# Patient Record
Sex: MALE | Race: WHITE
[De-identification: names, ages, dates, MRNs, and addresses within clinical notes are randomized per-mention and may not be internally consistent; named-entity substitution may affect disease eponyms.]

---

## 2021-12-10 ENCOUNTER — HOSPITAL ENCOUNTER (OUTPATIENT)
Dept: HOSPITAL 38 - CC.SDS | Age: 76
End: 2021-12-10
Attending: FAMILY MEDICINE
Payer: MEDICARE

## 2021-12-10 DIAGNOSIS — D12.3: ICD-10-CM

## 2021-12-10 DIAGNOSIS — E78.5: ICD-10-CM

## 2021-12-10 DIAGNOSIS — I10: ICD-10-CM

## 2021-12-10 DIAGNOSIS — H91.93: ICD-10-CM

## 2021-12-10 DIAGNOSIS — R09.82: ICD-10-CM

## 2021-12-10 DIAGNOSIS — Z98.890: ICD-10-CM

## 2021-12-10 DIAGNOSIS — N40.0: ICD-10-CM

## 2021-12-10 DIAGNOSIS — L30.9: ICD-10-CM

## 2021-12-10 DIAGNOSIS — Z12.11: Primary | ICD-10-CM

## 2021-12-10 DIAGNOSIS — Z79.899: ICD-10-CM

## 2021-12-10 DIAGNOSIS — E55.9: ICD-10-CM

## 2021-12-10 PROCEDURE — 45380 COLONOSCOPY AND BIOPSY: CPT

## 2021-12-10 NOTE — OR
DATE OF OPERATION:  12/10/2021

 

PREOPERATIVE DIAGNOSIS:  SCREENING COLONOSCOPY.

 

POSTOPERATIVE DIAGNOSIS:  SCREENING COLONOSCOPY.

 

SURGEON:  Eber Sousa MD

 

PROCEDURE:  FULL-LENGTH COLONOSCOPY WITH FORCEPS POLYP REMOVAL X1.

 

ANESTHESIA:  MAC.

 

COMPLICATIONS:  None.

 

SPECIMEN:  Small hyperplastic polyp, splenic flexure.

 

FINDINGS:

1. Full-length screening colonoscopy.

2. Small hyperplastic polyp, splenic flexure.

 

RECOMMENDATIONS:  Followup colonoscopy on an as-needed basis only given

patient's age.

 

INDICATIONS:  The patient is overdue for a routine screening scope.  Lesly Trujillo sent him for a screening colonoscopy.

 

DESCRIPTION OF PROCEDURE:  The patient was prepped and draped, placed in the

left lateral decubitus position.  A lubricated Olympus colonoscope was inserted

and easily advanced to the cecum.  Direct visualization of the ileocecal valve

and appendiceal orifice was accomplished.  The bowel prep was excellent.  Upon

withdrawal of the scope, cecum and ascending colon were completely benign.  At

the distal transverse colon right near the splenic flexure, patient had a small

hyperplastic polyp, probably 2-3 mm in size, removed it in its entirety with two

cold forceps biopsies without difficulty.  The rest of the descending colon was

unremarkable.  Throughout the sigmoid and rectosigmoid area, I could find no

further polyps, masses, ulceration, or bleeding sites.  No vascular

abnormalities or signs of colitis.  There were no diverticula.  The rectal vault

was unremarkable.

Retroflexion showed no perianal lesions.  Air was suctioned.  Scope was removed

without complication.

 

CHANEL/MARILEE

DD:  12/10/2021 09:29:07

DT:  12/10/2021 14:00:06

Job #:  [id number]

## 2022-07-17 ENCOUNTER — HOSPITAL ENCOUNTER (EMERGENCY)
Dept: HOSPITAL 38 - CC.ED | Age: 77
Discharge: HOME | End: 2022-07-17
Payer: MEDICARE

## 2022-07-17 DIAGNOSIS — Z79.899: ICD-10-CM

## 2022-07-17 DIAGNOSIS — I10: ICD-10-CM

## 2022-07-17 DIAGNOSIS — M53.3: Primary | ICD-10-CM

## 2022-07-17 DIAGNOSIS — E78.00: ICD-10-CM

## 2022-07-17 RX ADMIN — DEXAMETHASONE SODIUM PHOSPHATE ONE MG: 4 INJECTION, SOLUTION INTRAMUSCULAR; INTRAVENOUS at 07:53

## 2022-07-17 RX ADMIN — ORPHENADRINE CITRATE ONE MG: 60 INJECTION INTRAMUSCULAR; INTRAVENOUS at 07:53

## 2022-07-17 RX ADMIN — CYCLOBENZAPRINE HYDROCHLORIDE ONE PACKET: 10 TABLET, FILM COATED ORAL at 07:54

## 2022-07-17 RX ADMIN — PREDNISONE ONE PACKET: 20 TABLET ORAL at 07:54
